# Patient Record
(demographics unavailable — no encounter records)

---

## 2024-10-08 NOTE — DISCUSSION/SUMMARY
[FreeTextEntry1] : Mr. Woodson has multiple cardiovascular risk factors, including hypertension, a dyslipidemia, and diabetes.  He does not describe having experienced any signs or symptoms to suggest the presence of an anginal syndrome, congestive heart failure, or a hemodynamically-compromising arrhythmia.  His cardiac examination today is unremarkable.  His blood pressure reading today is normal.  His electrocardiogram today reveals sinus rhythm with mild first-degree AV block, right axis deviation, nonspecific diminished voltage in leads V5-V6, and nonspecific repolarization abnormalities (J-point elevation), essentially unchanged from his previous office tracing, allowing for lead placement variation.  As his blood pressure reading today is normal, I have instructed the patient to continue enalapril 10 mg once daily for the time being.  I have reviewed the findings of the blood test report of 9/10/2024 in detail with the patient today, and I have instructed him to continue Lipitor 10 mg daily and Zetia 10 mg daily for the time being.  In view of his multiple cardiovascular risk factors, I had referred the patient for exercise stress testing to evaluate for the presence of myocardial ischemia, however, he is presently unable to perform this study secondary to a right ankle injury, and more recently secondary to discomfort associated with his lumbar degenerative disc disease.  He will reschedule the study once these issues have been resolved.  The importance of proper dietary habits, proper weight maintenance, and regular exercise as tolerated was again discussed with the patient today.  I have asked the patient to call me if he should have any questions or problems pertaining to these matters, especially if he should experience any concerning symptoms.  I have otherwise asked him to return to the office for follow-up cardiac evaluation and blood pressure reassessment in 6 months, provided he remains clinically stable in the interim. [EKG obtained to assist in diagnosis and management of assessed problem(s)] : EKG obtained to assist in diagnosis and management of assessed problem(s)

## 2024-10-08 NOTE — HISTORY OF PRESENT ILLNESS
[FreeTextEntry1] : Mr. Capo Woodson presented to the office today for follow-up cardiac evaluation.  I evaluated the patient on 8/7/2023 in initial cardiology consultation regarding management of hypertension and a dyslipidemia.  He was referred for this consultation by his wife.  I last evaluated the patient in the office on 4/10/2024.  The patient is a 76-year-old male with a history of hypertension, a dyslipidemia, diabetes, BPH, thrombocytopenia, lumbar degenerative disc disease associated with moderate spinal stenosis (status post L4-L5 laminectomy with fusion 2017, status post L5-S1 laminectomy 2020), bilateral total hip replacements (right 2008, left 2012), a right shoulder rotator cuff tear, and bilateral plantar fasciitis.  The patient has been stable from a cardiac symptomatic standpoint since his previous visit here on 4/10/2024.  Specifically, he does not report having experienced chest discomfort or dyspnea on exertion in association with his activities.  He has not noted orthopnea, paroxysmal nocturnal dyspnea, or lower extremity edema.  He has not experienced any episodes of palpitations, presyncope or syncope.  At the time of the patient's initial visit here on 8/7/2023, I instructed him to increase the dosage of enalapril from 5 mg once daily to twice daily, in an effort to more optimally control his blood pressure.  In addition, in view of his cardiovascular risk factors, I referred him for exercise stress testing.  He canceled the study, as he injured his right ankle.  He has been diagnosed as having right Achilles tendinosis, for which he has been following with an orthopedist.  More recently, he has been experiencing discomfort related to his lumbar degenerative disc disease, as well as paresthesias involving the right knee region.  He anticipates undergoing spinal surgery, including fusion with a spacer device insertion involving L2-L3 on 11/6/2024, to be performed by Dr. Stallings at [facility].  As far as risk factors for coronary artery disease are concerned, the patient has a history of hypertension, a dyslipidemia, and diabetes.  He denies a history of cigarette smoking.  He does not have a known immediate family history of premature coronary artery disease.  Laboratory studies performed on 9/10/2024 (on Lipitor 10 mg daily and Zetia 10 mg daily) revealed cholesterol 126, triglycerides 112, HDL 33, and calculated LDL 71.  The liver chemistries were normal.  The BUN and creatinine were 27 and 1.1, respectively.  The potassium level was 4.9.  The glucose level was 154 and the hemoglobin A1c level was 6.4%.  The hemoglobin and hematocrit were 14.7 and 43.9, respectively.  The platelet count was 110,000.  The TSH level was 3.52.

## 2024-10-08 NOTE — CARDIOLOGY SUMMARY
[de-identified] : 10/8/24 -sinus rhythm at a rate of 60 bpm.  First-degree AV block.  Right axis deviation.  Nonspecific diminished voltage in leads V5-V6.  Nonspecific repolarization abnormalities.

## 2024-10-08 NOTE — PHYSICAL EXAM
[Well Developed] : well developed [No Acute Distress] : no acute distress [Normal Conjunctiva] : normal conjunctiva [No Carotid Bruit] : no carotid bruit [Normal S1, S2] : normal S1, S2 [No Murmur] : no murmur [No Gallop] : no gallop [Clear Lung Fields] : clear lung fields [No Respiratory Distress] : no respiratory distress  [Soft] : abdomen soft [Non Tender] : non-tender [Normal Gait] : normal gait [No Edema] : no edema [No Rash] : no rash [Moves all extremities] : moves all extremities [Alert and Oriented] : alert and oriented [de-identified] : No JVD is appreciated at a 45 degree angle

## 2024-10-23 NOTE — PHYSICAL EXAM
[Full Body Skin Exam Performed] : performed [FreeTextEntry3] : Skin examination performed of the face, neck, trunk, arms, legs;  The patient is well, alert and oriented, pleasant and cooperative. Eyelids, conjunctivae, oral mucosa, digits and nails all normal.   No cervical adenopathy.  Normal findings include:  Seborrheic keratoses Angiomas Lentigines AKs on upper forehead, scalp resolved;   No lesions were suspicious for malignancy.

## 2024-10-23 NOTE — ASSESSMENT
[FreeTextEntry1] : Complete skin examination is negative for malignancy; Multiple new concerns were addressed and discussed. Therapeutic options and their risks and benefits; along with multiple diagnostic possibilities were discussed at length; risks and benefits of skin biopsy and/or other further study were discussed;  Hx AKs;  no residual hyperkeratotic lesions  Continue regular exams; Follow up for TBSE in 1 year

## 2024-10-23 NOTE — HISTORY OF PRESENT ILLNESS
[de-identified] : Pt. presents for skin check; c/o few spots of concern;   Severity:  mild   Modifying factors:  none Associated symptoms:  none Context:  no association with activity

## 2025-02-20 NOTE — DISCUSSION/SUMMARY
[de-identified] : This patient is doing very well with his hips.  Following the injection for his bicipital tendinitis his pain was gone in his left shoulder and he had full motion.  At this time he is doing better in his back with his back is being followed by his spine surgeons and his function is satisfactory.  He will return for follow-up of his hips in 1 to 2 years but sooner if necessary and if his shoulder again gives him pain he will come in sooner.  He will continue taking Celebrex 200 mg once a day when needed.  The risk of anti-inflammatory medicines were discussed with the patient.  If there is any sign of rash or allergy it should be stopped immediately.  There is a risk of causing GI irritation which would also be a reason to stop the medication.  Some of the anti-inflammatory medications can cause a rise in blood pressure.  If this is an issue then please speak to her internist.  Injection risk factors  The possible risks discussed include pain, swelling, heat, muscle stiffness and fulness and rare infection, allergy, and face flushing.  On very rare occasions following a cortisone injection there can be the development of avascular necrosis within the joint.     A total of  31  minutes were spent in direct care in this patient , including  reviewing previous notes, counseling the patient,  ordering tests , reviewing the medication plan, and documenting the findings in the note. This excludes teaching and other separate billable services.

## 2025-02-20 NOTE — PROCEDURE
[de-identified] : Procedure Note:   Anatomic Location: Left shoulder  Diagnosis: Left shoulder bicipital tendinitis  Procedure:  Injection of 4ccs of Marcaine 0.5% plain and Kenalog 40mg, 1 cc  Local Spray: Ethyl Chloride.  Skin preparation with alcohol.  Patient has consented for the procedure.  Injection 22-gaugeneedle  through a direct anterior approach.  Patient tolerated the procedure well.  Patient instructed to call the office if any reaction, fever, chills, increased erythema or swelling.   [phone].

## 2025-02-20 NOTE — HISTORY OF PRESENT ILLNESS
[de-identified] : Mr. RUPINDER NEWSOME  is a 76 year old male who presents to the office for a follow-up visit.  He is s/p left THR in 2012 and right THR in 2008.  Overall, he is doing well.  He takes celebrex as needed.

## 2025-02-20 NOTE — PHYSICAL EXAM
[de-identified] : Previously the patient was having increased problems with his back where he does have a lower lumbar fusion.  3 months ago he underwent a L2-3 distraction of the interspace at the [facility] for special surgery and he feels better but still has some backache.  His deep tendon reflexes are plus or minus the knees and ankles motor and sensation are within normal limits.  He has a new problem as far as his left shoulder he has full range of motion of shoulder but considerable pain with abduction and full and flexion over the bicipital tendon this is consistent with bicipital tendinitis his strength remains normal and his motion is good.    He continues to do extremely well as far as his total hip replacements.  His right hip is a Biomet metal-on-metal M to a total hip and his left hip is a classic metal on polyethylene hip.  His range of motion remains excellent and has not changed significantly.  Today he appears to have Jovany symmetric motion in both of his hips with flexion of 125, abduction of 70, adduction of 20, external rotation of 65 and internal rotation of 20.  He has no pain in his hips and he is walking well.  His knees do have full range of motion bilaterally no effusion no Baker's cyst good medial lateral and anterior posterior stability.  His knee examination is normal. [de-identified] : 4 views were done of the patient's right knee with an AP of the left knee the left knee shows good joint space in both the medial lateral compartments and normal alignment on the AP view the right knee also shows joint spaces well-maintained medially and laterally in normal alignment no evidence of osteophytes.  The Hollingsworth view also shows the joint spaces medially and laterally are well-preserved on the right the lateral view shows the patella which is of normal height and the skyline view shows that the patellofemoral joint has good cartilage is without degenerative arthritis.  An AP of the pelvis and a lateral of the right and left hips were done.  At the AP of the pelvis the lowest area of the his lower lumbar fixation to the sacrum can be seen with pedicle screws.  His right hip is the Biomet metal-on-metal M to a total hip replacement it is in excellent position it is well-fixed.  His left hip shows a Biomet all porous metal on plastic total hip replacement also in excellent position and well-fixed.

## 2025-03-13 NOTE — PHYSICAL EXAM
[Normal Appearance] : normal appearance [Well Groomed] : well groomed [General Appearance - In No Acute Distress] : no acute distress [Edema] : no peripheral edema [Respiration, Rhythm And Depth] : normal respiratory rhythm and effort [Exaggerated Use Of Accessory Muscles For Inspiration] : no accessory muscle use [Abdomen Soft] : soft [Abdomen Tenderness] : non-tender [Costovertebral Angle Tenderness] : no ~M costovertebral angle tenderness [Urinary Bladder Findings] : the bladder was normal on palpation [Normal Station and Gait] : the gait and station were normal for the patient's age [] : no rash [No Focal Deficits] : no focal deficits [Oriented To Time, Place, And Person] : oriented to person, place, and time [Affect] : the affect was normal [Mood] : the mood was normal [No Palpable Adenopathy] : no palpable adenopathy [Chaperone Present] : A chaperone was present in the examining room during all aspects of the physical examination [FreeTextEntry2] : rosa m

## 2025-04-14 NOTE — PHYSICAL EXAM
[Well Developed] : well developed [No Acute Distress] : no acute distress [Normal Conjunctiva] : normal conjunctiva [No Carotid Bruit] : no carotid bruit [Normal S1, S2] : normal S1, S2 [No Murmur] : no murmur [No Gallop] : no gallop [Clear Lung Fields] : clear lung fields [No Respiratory Distress] : no respiratory distress  [Soft] : abdomen soft [Non Tender] : non-tender [Normal Gait] : normal gait [No Edema] : no edema [No Rash] : no rash [Moves all extremities] : moves all extremities [Alert and Oriented] : alert and oriented [de-identified] : No JVD is appreciated at a 45 degree angle

## 2025-04-14 NOTE — DISCUSSION/SUMMARY
[FreeTextEntry1] : Mr. Woodson has multiple cardiovascular risk factors, including hypertension, a dyslipidemia, and diabetes.  He does not describe having experienced any signs or symptoms to suggest the presence of an anginal syndrome, congestive heart failure, or a hemodynamically-compromising arrhythmia.  His cardiac examination today is unremarkable.  His blood pressure reading today is normal.  His electrocardiogram today reveals sinus rhythm with non-pathological Q waves in leads II, III, aVF, and V3-V6, nonspecific diminished voltage in leads V5-V6, and nonspecific repolarization abnormalities (J-point elevation), essentially unchanged from his previous office tracing, allowing for lead placement variation.  As his blood pressure reading today is normal, I have instructed the patient to continue enalapril 10 mg once daily for the time being.  I have reviewed the findings of the blood test report of 2/27/2025 in detail with the patient today, and I have instructed him to continue Lipitor 10 mg daily and Zetia 10 mg daily for the time being.  In view of his multiple cardiovascular risk factors, I am referring the patient for exercise stress testing to evaluate for the presence of myocardial ischemia.  He is going to make arrangements to have this study performed through our office, and I will telephone him to discuss the findings, once the study has been completed.  Note that he anticipates his exercise tolerance will be limited secondary to a lingering right ankle injury and lumbar degenerative disc disease.  The importance of proper dietary habits, proper weight maintenance, and regular exercise as tolerated was again discussed with the patient today.  I have asked the patient to call me if he should have any questions or problems pertaining to these matters, especially if he should experience any concerning symptoms.  I have otherwise asked him to return to the office for follow-up cardiac evaluation and blood pressure reassessment in 6 months, provided he remains clinically stable in the interim. [EKG obtained to assist in diagnosis and management of assessed problem(s)] : EKG obtained to assist in diagnosis and management of assessed problem(s)

## 2025-04-14 NOTE — CARDIOLOGY SUMMARY
[de-identified] : 4/14/25 -sinus rhythm at a rate of 64 bpm.  Nonpathological Q waves in leads II, III, aVF, and V3-V6.  Nonspecific diminished voltage in leads V5-V6.  Nonspecific repolarization abnormalities.

## 2025-04-14 NOTE — HISTORY OF PRESENT ILLNESS
[FreeTextEntry1] : Mr. Capo Woodson presented to the office today for follow-up cardiac evaluation.  I evaluated the patient on 8/7/2023 in initial cardiology consultation regarding management of hypertension and a dyslipidemia.  He was referred for this consultation by his wife.  I last evaluated the patient in the office on 10/8/2024.  The patient is a 76-year-old male with a history of hypertension, a dyslipidemia, diabetes, BPH, thrombocytopenia, lumbar degenerative disc disease associated with moderate spinal stenosis (status post L4-L5 laminectomy with fusion 2017, status post L5-S1 laminectomy 2020, status post fusion with a spacer device insertion involving L2-L3 on 11/6/2024), bilateral total hip replacements (right 2008, left 2012), a right shoulder rotator cuff tear, and bilateral plantar fasciitis.  The patient has been stable from a cardiac symptomatic standpoint since his previous visit here on 4/10/2024.  Specifically, he does not report having experienced chest discomfort or dyspnea on exertion in association with his activities.  He has not noted orthopnea, paroxysmal nocturnal dyspnea, or lower extremity edema.  He has not experienced any episodes of palpitations, presyncope or syncope.  As far as risk factors for coronary artery disease are concerned, the patient has a history of hypertension, a dyslipidemia, and diabetes.  He denies a history of cigarette smoking.  He does not have a known immediate family history of premature coronary artery disease.  Laboratory studies performed on 2/27/2025 (on Lipitor 10 mg daily and Zetia 10 mg daily) revealed cholesterol 132, triglycerides 125, HDL 35, and calculated LDL 72.  The liver chemistries were normal.  The BUN and creatinine were 24 and 1.06, respectively.  The potassium level was 4.7.  The glucose level was 123 and the hemoglobin A1c level was 6.6%.  The hemoglobin and hematocrit were 15.1 and 44.2, respectively.  The TSH level was 4.07.

## 2025-05-13 NOTE — HISTORY OF PRESENT ILLNESS
[Walking] : walking [] : no [FreeTextEntry5] : 75 y/o M presents for NP eval today. Pt reports of chronic knee pain with no associated trauma. Denies any swelling. Tylenol or Advil as needed.

## 2025-05-13 NOTE — ASSESSMENT
[FreeTextEntry1] : The patient is a 75 yo man presenting with right knee pain. He is recovering 6 months from a lumbar spine surgery. The patient reports a few months right knee pain. The patient has anterior and lateral joint pain. He has pain with ADLs. He has pain with overuse. The patient has pain with walking for time. He denies pain with initiating motion. He has pain with ADLs. He denies pain at night or at rest. He can bend and get on the floor as needed. The patient is using gabapentin, celebrex, and occasional pain medications as needed. He denies night pain.  Right knee exam: Well appearing male in no apparent distress. No rashes, scars, or abrasions. Neurovascularly intact. Tender to palpation at medial joint line. Ranging from 0-120. Anterior/posterior drawer negative, - Mcmurrays. - Lachmans. Screening exam of the right hip shows a full, painless ROM.  X-rays done in the office today of the right knee 4 views weightbearing show mild medial joint space narrowing consistent with early arthritis.  There are no obvious tumors, mass or calcifications seen.  Under sterile conditions the right knee was injected with 5 cc of quarter percent plain Marcaine; 5 cc of 2% plain lidocaine and 80 mg of Depo-Medrol.  Patient tolerated the procedure well.  Plan at this time is to continue Celebrex periodically and activity modification.  Will consider physical therapy or gel injections in the future as needed.

## 2025-05-13 NOTE — HISTORY OF PRESENT ILLNESS
[Walking] : walking [] : no [FreeTextEntry5] : 77 y/o M presents for NP eval today. Pt reports of chronic knee pain with no associated trauma. Denies any swelling. Tylenol or Advil as needed.

## 2025-05-13 NOTE — ASSESSMENT
[FreeTextEntry1] : The patient is a 77 yo man presenting with right knee pain. He is recovering 6 months from a lumbar spine surgery. The patient reports a few months right knee pain. The patient has anterior and lateral joint pain. He has pain with ADLs. He has pain with overuse. The patient has pain with walking for time. He denies pain with initiating motion. He has pain with ADLs. He denies pain at night or at rest. He can bend and get on the floor as needed. The patient is using gabapentin, celebrex, and occasional pain medications as needed. He denies night pain.  Right knee exam: Well appearing male in no apparent distress. No rashes, scars, or abrasions. Neurovascularly intact. Tender to palpation at medial joint line. Ranging from 0-120. Anterior/posterior drawer negative, - Mcmurrays. - Lachmans. Screening exam of the right hip shows a full, painless ROM.  X-rays done in the office today of the right knee 4 views weightbearing show mild medial joint space narrowing consistent with early arthritis.  There are no obvious tumors, mass or calcifications seen.  Under sterile conditions the right knee was injected with 5 cc of quarter percent plain Marcaine; 5 cc of 2% plain lidocaine and 80 mg of Depo-Medrol.  Patient tolerated the procedure well.  Plan at this time is to continue Celebrex periodically and activity modification.  Will consider physical therapy or gel injections in the future as needed.

## 2025-07-22 NOTE — PHYSICAL EXAM
[FreeTextEntry3] : Face: + lentigines and solar damage are present in sun exposed areas;  left infranasal:  scaling erythematous papule;   Right neck;  Scaling waxy stuck on papule; dark brown

## 2025-07-22 NOTE — ASSESSMENT
[FreeTextEntry1] : Therapeutic options and their risks and benefits; along with multiple diagnostic possibilities were discussed at length; risks and benefits of further study were discussed;   The patient was instructed to check portal and/or call the office in one week for biopsy results.   Plan to treat by D&C if the biopsy is positive.  Discussed cosmetic removal of SK R neck in future